# Patient Record
Sex: FEMALE | URBAN - METROPOLITAN AREA
[De-identification: names, ages, dates, MRNs, and addresses within clinical notes are randomized per-mention and may not be internally consistent; named-entity substitution may affect disease eponyms.]

---

## 2019-11-22 ENCOUNTER — HOSPITAL ENCOUNTER (OUTPATIENT)
Dept: LAB | Age: 59
Discharge: HOME | End: 2019-11-22
Payer: OTHER GOVERNMENT

## 2019-11-22 ENCOUNTER — OFFICE VISIT (OUTPATIENT)
Dept: ONCOLOGY | Age: 59
End: 2019-11-22

## 2019-11-22 ENCOUNTER — HOSPITAL ENCOUNTER (OUTPATIENT)
Dept: ONCOLOGY | Age: 59
Discharge: HOME | End: 2019-11-22

## 2019-11-22 VITALS
RESPIRATION RATE: 16 BRPM | HEART RATE: 113 BPM | TEMPERATURE: 97.3 F | BODY MASS INDEX: 17.66 KG/M2 | OXYGEN SATURATION: 100 % | WEIGHT: 119.2 LBS | SYSTOLIC BLOOD PRESSURE: 141 MMHG | HEIGHT: 69 IN | DIASTOLIC BLOOD PRESSURE: 83 MMHG

## 2019-11-22 DIAGNOSIS — D64.9 CHRONIC ANEMIA: ICD-10-CM

## 2019-11-22 DIAGNOSIS — D50.8 IRON DEFICIENCY ANEMIA SECONDARY TO INADEQUATE DIETARY IRON INTAKE: ICD-10-CM

## 2019-11-22 DIAGNOSIS — D50.8 IRON DEFICIENCY ANEMIA SECONDARY TO INADEQUATE DIETARY IRON INTAKE: Primary | ICD-10-CM

## 2019-11-22 LAB
ALBUMIN SERPL-MCNC: 4.1 G/DL (ref 3.4–5)
ALBUMIN/GLOB SERPL: 1.2 {RATIO} (ref 0.8–1.7)
ALP SERPL-CCNC: 67 U/L (ref 45–117)
ALT SERPL-CCNC: 24 U/L (ref 13–56)
ANION GAP SERPL CALC-SCNC: 3 MMOL/L (ref 3–18)
AST SERPL-CCNC: 12 U/L (ref 10–38)
BASO+EOS+MONOS # BLD AUTO: 0.4 K/UL (ref 0–2.3)
BASO+EOS+MONOS NFR BLD AUTO: 11 % (ref 0.1–17)
BILIRUB SERPL-MCNC: 0.4 MG/DL (ref 0.2–1)
BUN SERPL-MCNC: 18 MG/DL (ref 7–18)
BUN/CREAT SERPL: 21 (ref 12–20)
CALCIUM SERPL-MCNC: 9.9 MG/DL (ref 8.5–10.1)
CHLORIDE SERPL-SCNC: 106 MMOL/L (ref 100–111)
CO2 SERPL-SCNC: 32 MMOL/L (ref 21–32)
CREAT SERPL-MCNC: 0.86 MG/DL (ref 0.6–1.3)
DIFFERENTIAL METHOD BLD: ABNORMAL
ERYTHROCYTE [DISTWIDTH] IN BLOOD BY AUTOMATED COUNT: 12.5 % (ref 11.5–14.5)
GLOBULIN SER CALC-MCNC: 3.4 G/DL (ref 2–4)
GLUCOSE SERPL-MCNC: 104 MG/DL (ref 74–99)
HCT VFR BLD AUTO: 35.4 % (ref 36–48)
HGB BLD-MCNC: 11.2 G/DL (ref 12–16)
LYMPHOCYTES # BLD: 1.7 K/UL (ref 1.1–5.9)
LYMPHOCYTES NFR BLD: 41 % (ref 14–44)
MCH RBC QN AUTO: 29 PG (ref 25–35)
MCHC RBC AUTO-ENTMCNC: 31.6 G/DL (ref 31–37)
MCV RBC AUTO: 91.7 FL (ref 78–102)
NEUTS SEG # BLD: 1.9 K/UL (ref 1.8–9.5)
NEUTS SEG NFR BLD: 48 % (ref 40–70)
PLATELET # BLD AUTO: 165 K/UL (ref 140–440)
POTASSIUM SERPL-SCNC: 3.8 MMOL/L (ref 3.5–5.5)
PROT SERPL-MCNC: 7.5 G/DL (ref 6.4–8.2)
RBC # BLD AUTO: 3.86 M/UL (ref 4.1–5.1)
RETICS/RBC NFR AUTO: 1.1 % (ref 0.5–2.3)
SODIUM SERPL-SCNC: 141 MMOL/L (ref 136–145)
WBC # BLD AUTO: 4 K/UL (ref 4.5–13)

## 2019-11-22 PROCEDURE — 84165 PROTEIN E-PHORESIS SERUM: CPT

## 2019-11-22 PROCEDURE — 80053 COMPREHEN METABOLIC PANEL: CPT

## 2019-11-22 PROCEDURE — 82728 ASSAY OF FERRITIN: CPT

## 2019-11-22 PROCEDURE — 83021 HEMOGLOBIN CHROMOTOGRAPHY: CPT

## 2019-11-22 PROCEDURE — 83540 ASSAY OF IRON: CPT

## 2019-11-22 PROCEDURE — 36415 COLL VENOUS BLD VENIPUNCTURE: CPT

## 2019-11-22 PROCEDURE — 82668 ASSAY OF ERYTHROPOIETIN: CPT

## 2019-11-22 PROCEDURE — 85045 AUTOMATED RETICULOCYTE COUNT: CPT

## 2019-11-22 NOTE — PROGRESS NOTES
Hematology/Oncology Consultation Note    Name: Renaldo Sheffield  Date: 2019  : 1960    PCP: Yue Erickson MD       Ms. Yomi Epstein  is a 62 y.o. -American woman who was referred for an evaluation of anemia of unclear etiology. Subjective:     Chief complaint: Anemia    History of present illness:  Ms. Yomi Epstein is a 68-year-old -American woman who was referred for an evaluation of anemia. Back in 2019, during a hospitalization the patient was found to have anemia, on 2019, with a hemoglobin of 9.5 g/dL and hematocrit of 28.9%. At the time a WBC count was slightly elevated at 16.2 and a platelet to 150,490. A CBC dated 4/10/2019 showed that a WBC count 7.8, hemoglobin was 12.2 g/dL, hematocrit was 35.5%, and a platelet count was 367,172. She denies having a history of gastrointestinal or genitourinary blood loss. There is no history of ulcer disease. She is complaining of some fatigue and weakness. She is using a walker for mobility support. Past Medical History:   Diagnosis Date    Blurred vision     Change in weight     Double vision     Fast heart beat     Frequent urinary incontinence     Hemorrhoids     Poor appetite        No Known Allergies    History reviewed. No pertinent surgical history.     Social History     Socioeconomic History    Marital status: UNKNOWN     Spouse name: Not on file    Number of children: Not on file    Years of education: Not on file    Highest education level: Not on file   Occupational History    Not on file   Social Needs    Financial resource strain: Not on file    Food insecurity:     Worry: Not on file     Inability: Not on file    Transportation needs:     Medical: Not on file     Non-medical: Not on file   Tobacco Use    Smoking status: Never Smoker    Smokeless tobacco: Never Used   Substance and Sexual Activity    Alcohol use: Not Currently    Drug use: Never    Sexual activity: Not Currently Lifestyle    Physical activity:     Days per week: Not on file     Minutes per session: Not on file    Stress: Not on file   Relationships    Social connections:     Talks on phone: Not on file     Gets together: Not on file     Attends Orthodoxy service: Not on file     Active member of club or organization: Not on file     Attends meetings of clubs or organizations: Not on file     Relationship status: Not on file    Intimate partner violence:     Fear of current or ex partner: Not on file     Emotionally abused: Not on file     Physically abused: Not on file     Forced sexual activity: Not on file   Other Topics Concern    Not on file   Social History Narrative    Not on file       Family History   Problem Relation Age of Onset    Cancer Sister     Diabetes Sister     Diabetes Brother     Diabetes Sister     Diabetes Brother        Current Outpatient Medications   Medication Sig Dispense Refill    tolterodine tartrate (DETROL PO) Take  by mouth.  eletriptan hydrobromide (RELPAX PO) Take  by mouth. Review of Systems    General ROS:The patient has complaints of some fatigue and weakness. Psychological ROS: patient denies having any psychological symptoms such as hallucinations, depression or anxiety. Ophthalmic ROS:the patient denies having any visual impairment or eye discomfort. ENT ROS: there are no abnormalities reported. Allergy and Immunology ROS:the patient denies having any seasonal allergies or allergies to medications other than those already outlined above. Hematological and Lymphatic ROS: the patient denies having any bruising, bleeding or lymphadenopathy. Endocrine ROS: the patient denies having any heat or cold intolerance. There is no history of diabetes or thyroid disorders. Breast ROS: the patient denies having any history of breast mass, nipple discharge, or lumps.   Respiratory ROS:the patient denies having any cough, shortness of breath, or dyspnea on exertion. Cardiovascular ROS: there are no complaints of chest pain, palpitations, chest pounding, or dyspnea on exertion. Gastrointestinal ROS: the patient denies having nausea, emesis, diarrhea, constipation, or blood in the stool. Genito-Urinary ROS: the patient denies having urinary urgency, frequency, or dysuria. Musculoskeletal ROS: with the exception of mild arthralgias the patient has no other musculoskeletal complaints. Neurological ROS: the patient denies having any numbness, tingling, or neurologic deficits. Dermatological ROS:patient denies having any unexplained rash, skin ulcerations, or hives. Objective:     Visit Vitals  /83   Pulse (!) 113   Temp 97.3 °F (36.3 °C) (Oral)   Resp 16   Ht 5' 9\" (1.753 m)   Wt 54.1 kg (119 lb 3.2 oz)   SpO2 100%   BMI 17.60 kg/m²        ECOGPS=0  Physical Exam:   Gen. Appearance: the patient is in no acute distress. Skin: There is no evidence of bruise or rash. HEENT: The head is normocephalic and atraumatic. The conjunctiva and sclera are clear. Pupils are equal, round, reactive to light, and accommodation. The extraocular movements are intact. ENT reveals no oral mucosal lesions or ulcerations. Neck: Supple without lymphadenopathy or thyromegaly. Lungs: Clear to auscultation and percussion; there are no wheezes or rhonchi. Heart: Regular rate and rhythm; there are no murmurs, gallops, or rubs. Abdomen: Bowel sounds are present and normal.  There is no guarding, tenderness, or hepatosplenomegaly. Extremities: There is no clubbing, cyanosis, or edema. Neurologic: There are no focal neurologic deficits. Lymphatics: There is no palpable peripheral lymphadenopathy. Lab data: The CBC from May 10, 2019 shows a WBC count of 14, hemoglobin is 9.9 g/dL, hematocrit is 29.5%, and the platelet count is 96,435. Assessment:   Chronic anemia: I have explained to the patient that she appears to have chronic anemia of unclear etiology.   I am concerned that she may have a functional iron deficiency versus a slowly evolving plasma cell dyscrasia or underlying myelodysplastic syndrome. Plan:   Chronic anemia: A comprehensive metabolic panel, iron profile, ferritin level, reticulocyte count, erythropoietin level, serum protein electrophoresis, and hemoglobin electrophoresis will be ordered. I will see the patient back in clinic in 2 weeks to review lab data and to discuss long-term options of management. Follow-up in 2 weeks. Orders Placed This Encounter    METABOLIC PANEL, COMPREHENSIVE     Standing Status:   Future     Standing Expiration Date:   11/22/2020    IRON PROFILE     Standing Status:   Future     Standing Expiration Date:   11/22/2020    FERRITIN     Standing Status:   Future     Standing Expiration Date:   11/22/2020    PROTEIN ELECTROPHORESIS     Standing Status:   Future     Standing Expiration Date:   11/22/2020    RETICULOCYTE COUNT     Standing Status:   Future     Standing Expiration Date:   11/22/2020    ERYTHROPOIETIN     Standing Status:   Future     Standing Expiration Date:   11/22/2020    HEMOGLOBIN FRACTIONATION     Standing Status:   Future     Standing Expiration Date:   11/22/2020    tolterodine tartrate (DETROL PO)     Sig: Take  by mouth.  eletriptan hydrobromide (RELPAX PO)     Sig: Take  by mouth. Tammy Gracia MD  11/22/2019      Please note: This document has been produced using voice recognition software. Unrecognized errors in transcription may be present.

## 2019-11-23 LAB
FERRITIN SERPL-MCNC: 69 NG/ML (ref 8–388)
IRON SATN MFR SERPL: 27 %
IRON SERPL-MCNC: 92 UG/DL (ref 50–175)
TIBC SERPL-MCNC: 347 UG/DL (ref 250–450)

## 2019-12-02 LAB
ALBUMIN SERPL ELPH-MCNC: NORMAL G/DL
ALBUMIN/GLOB SERPL: 1.3 {RATIO}
ALPHA1 GLOB SERPL ELPH-MCNC: NORMAL G/DL
ALPHA2 GLOB SERPL ELPH-MCNC: NORMAL G/DL
B-GLOBULIN SERPL ELPH-MCNC: NORMAL G/DL
DEPRECATED HGB OTHER BLD-IMP: 0 %
GAMMA GLOB SERPL ELPH-MCNC: NORMAL G/DL
GLOBULIN SER CALC-MCNC: NORMAL G/DL
HGB A MFR BLD: 98.2 %
HGB A2 MFR BLD COLUMN CHROM: 1.8 %
HGB C MFR BLD: 0 %
HGB F MFR BLD: 0 %
HGB FRACT BLD-IMP: NORMAL
HGB S BLD QL SOLY: NEGATIVE
HGB S MFR BLD: 0 %
M PROTEIN SERPL ELPH-MCNC: NORMAL G/DL
PROT SERPL-MCNC: NORMAL G/DL

## 2019-12-13 ENCOUNTER — HOSPITAL ENCOUNTER (OUTPATIENT)
Dept: LAB | Age: 59
Discharge: HOME | End: 2019-12-13
Payer: OTHER GOVERNMENT

## 2019-12-13 ENCOUNTER — OFFICE VISIT (OUTPATIENT)
Dept: ONCOLOGY | Age: 59
End: 2019-12-13

## 2019-12-13 ENCOUNTER — HOSPITAL ENCOUNTER (OUTPATIENT)
Dept: ONCOLOGY | Age: 59
Discharge: HOME | End: 2019-12-13

## 2019-12-13 VITALS
BODY MASS INDEX: 17.42 KG/M2 | DIASTOLIC BLOOD PRESSURE: 89 MMHG | SYSTOLIC BLOOD PRESSURE: 139 MMHG | RESPIRATION RATE: 16 BRPM | WEIGHT: 117.6 LBS | HEIGHT: 69 IN | OXYGEN SATURATION: 99 % | HEART RATE: 109 BPM | TEMPERATURE: 97.4 F

## 2019-12-13 DIAGNOSIS — D64.9 CHRONIC ANEMIA: ICD-10-CM

## 2019-12-13 DIAGNOSIS — D72.819 CHRONIC LEUKOPENIA: ICD-10-CM

## 2019-12-13 DIAGNOSIS — D50.8 IRON DEFICIENCY ANEMIA SECONDARY TO INADEQUATE DIETARY IRON INTAKE: ICD-10-CM

## 2019-12-13 DIAGNOSIS — D50.8 IRON DEFICIENCY ANEMIA SECONDARY TO INADEQUATE DIETARY IRON INTAKE: Primary | ICD-10-CM

## 2019-12-13 LAB
BASO+EOS+MONOS # BLD AUTO: 0.1 K/UL (ref 0–2.3)
BASO+EOS+MONOS NFR BLD AUTO: 3 % (ref 0.1–17)
DIFFERENTIAL METHOD BLD: ABNORMAL
ERYTHROCYTE [DISTWIDTH] IN BLOOD BY AUTOMATED COUNT: 12.7 % (ref 11.5–14.5)
HCT VFR BLD AUTO: 36.1 % (ref 36–48)
HGB BLD-MCNC: 11.5 G/DL (ref 12–16)
LYMPHOCYTES # BLD: 1.9 K/UL (ref 1.1–5.9)
LYMPHOCYTES NFR BLD: 47 % (ref 14–44)
MCH RBC QN AUTO: 29.3 PG (ref 25–35)
MCHC RBC AUTO-ENTMCNC: 31.9 G/DL (ref 31–37)
MCV RBC AUTO: 91.9 FL (ref 78–102)
NEUTS SEG # BLD: 2 K/UL (ref 1.8–9.5)
NEUTS SEG NFR BLD: 50 % (ref 40–70)
PLATELET # BLD AUTO: 169 K/UL (ref 140–440)
RBC # BLD AUTO: 3.93 M/UL (ref 4.1–5.1)
WBC # BLD AUTO: 4 K/UL (ref 4.5–13)

## 2019-12-13 PROCEDURE — 88184 FLOWCYTOMETRY/ TC 1 MARKER: CPT

## 2019-12-13 PROCEDURE — 88185 FLOWCYTOMETRY/TC ADD-ON: CPT

## 2019-12-13 PROCEDURE — 36415 COLL VENOUS BLD VENIPUNCTURE: CPT

## 2019-12-13 NOTE — PATIENT INSTRUCTIONS
Anemia: Care Instructions  Your Care Instructions    Anemia is a low level of red blood cells, which carry oxygen throughout your body. Many things can cause anemia. Lack of iron is one of the most common causes. Your body needs iron to make hemoglobin, a substance in red blood cells that carries oxygen from the lungs to your body's cells. Without enough iron, the body produces fewer and smaller red blood cells. As a result, your body's cells do not get enough oxygen, and you feel tired and weak. And you may have trouble concentrating. Bleeding is the most common cause of a lack of iron. You may have heavy menstrual bleeding or bleeding caused by conditions such as ulcers, hemorrhoids, or cancer. Regular use of aspirin or other anti-inflammatory medicines (such as ibuprofen) also can cause bleeding in some people. A lack of iron in your diet also can cause anemia, especially at times when the body needs more iron, such as during pregnancy, infancy, and the teen years. Your doctor may have prescribed iron pills. It may take several months of treatment for your iron levels to return to normal. Your doctor also may suggest that you eat foods that are rich in iron, such as meat and beans. There are many other causes of anemia. It is not always due to a lack of iron. Finding the specific cause of your anemia will help your doctor find the right treatment for you. Follow-up care is a key part of your treatment and safety. Be sure to make and go to all appointments, and call your doctor if you are having problems. It's also a good idea to know your test results and keep a list of the medicines you take. How can you care for yourself at home? · Take your medicines exactly as prescribed. Call your doctor if you think you are having a problem with your medicine. · If your doctor recommends iron pills, take them as directed:  ? Try to take the pills on an empty stomach about 1 hour before or 2 hours after meals. But you may need to take iron with food to avoid an upset stomach. ? Do not take antacids or drink milk or caffeine drinks (such as coffee, tea, or cola) at the same time or within 2 hours of the time that you take your iron. They can make it hard for your body to absorb the iron. ? Vitamin C (from food or supplements) helps your body absorb iron. Try taking iron pills with a glass of orange juice or some other food that is high in vitamin C, such as citrus fruits. ? Iron pills may cause stomach problems, such as heartburn, nausea, diarrhea, constipation, and cramps. Be sure to drink plenty of fluids, and include fruits, vegetables, and fiber in your diet each day. Iron pills often make your bowel movements dark or green. ? If you forget to take an iron pill, do not take a double dose of iron the next time you take a pill. ? Keep iron pills out of the reach of small children. An overdose of iron can be very dangerous. · Follow your doctor's advice about eating iron-rich foods. These include red meat, shellfish, poultry, eggs, beans, raisins, whole-grain bread, and leafy green vegetables. · Steam vegetables to help them keep their iron content. When should you call for help? Call 911 anytime you think you may need emergency care. For example, call if:    · You have symptoms of a heart attack. These may include:  ? Chest pain or pressure, or a strange feeling in the chest.  ? Sweating. ? Shortness of breath. ? Nausea or vomiting. ? Pain, pressure, or a strange feeling in the back, neck, jaw, or upper belly or in one or both shoulders or arms. ? Lightheadedness or sudden weakness. ? A fast or irregular heartbeat. After you call 911, the  may tell you to chew 1 adult-strength or 2 to 4 low-dose aspirin. Wait for an ambulance.  Do not try to drive yourself.     · You passed out (lost consciousness).    Call your doctor now or seek immediate medical care if:    · You have new or increased shortness of breath.     · You are dizzy or lightheaded, or you feel like you may faint.     · Your fatigue and weakness continue or get worse.     · You have any abnormal bleeding, such as:  ? Nosebleeds. ? Vaginal bleeding that is different (heavier, more frequent, at a different time of the month) than what you are used to.  ? Bloody or black stools, or rectal bleeding. ? Bloody or pink urine.    Watch closely for changes in your health, and be sure to contact your doctor if:    · You do not get better as expected. Where can you learn more? Go to http://dena-teresa.info/. Enter R301 in the search box to learn more about \"Anemia: Care Instructions. \"  Current as of: March 28, 2019  Content Version: 12.2  © 5918-6503 [employer]. Care instructions adapted under license by [employer] (which disclaims liability or warranty for this information). If you have questions about a medical condition or this instruction, always ask your healthcare professional. [address] any warranty or liability for your use of this information.

## 2019-12-13 NOTE — PROGRESS NOTES
Hematology/medical oncology progress note    12/13/2019  Feli Cordon  YOB: 1960    PCP: Dr. Henny Hill    Diagnosis: Functional iron deficiency anemia    Ms. Forrest Li is a 42-year-old woman who was seen on 11/22/2018 for an assessment of anemia. I explained to the patient that on the day of her initial visitation she had a hemoglobin of 11.3 g/dL. Iron studies revealed that her iron level was 92 mcg/dL with an iron saturation of 27%. Her kidney function was normal with a BUN of 18 and a creatinine of 0.82 mg/dL. An SPEP was completed and this revealed a normal monoclonal paraprotein level which was not detected. The CBC from today shows that her WBC count 4, hemoglobin has increased to 11.5 g/dL with hematocrit of 36.1%, and a platelet count of 649,023. She has a functional iron deficiency and at this time I am recommending that she take Slow Fe 1 tablet daily. If she develops any constipation she should take Senokot-S 1 tablet to 2 tablets at bedtime. Since her leukocyte population has declined we will schedule an immunophenotyping profile to assess for any evidence of immunophenotypic aberrancy. Otherwise, I will plan to see her back in clinic in 2 months. Total time 25 minutes, greater than 50% of the time was in counseling and coordination of care. Wilberto Darling MD, Kasia Roberts

## 2019-12-16 LAB
COMMENT , 490046: NORMAL
FLOW INTERPRETATION: NORMAL
SPECIMEN SOURCE: NORMAL

## 2019-12-20 LAB — EPO SERPL-ACNC: 13.7 MIU/ML

## 2020-02-28 ENCOUNTER — HOSPITAL ENCOUNTER (OUTPATIENT)
Dept: LAB | Age: 60
Discharge: HOME | End: 2020-02-28
Payer: OTHER GOVERNMENT

## 2020-02-28 ENCOUNTER — OFFICE VISIT (OUTPATIENT)
Dept: ONCOLOGY | Age: 60
End: 2020-02-28

## 2020-02-28 ENCOUNTER — HOSPITAL ENCOUNTER (OUTPATIENT)
Dept: ONCOLOGY | Age: 60
Discharge: HOME | End: 2020-02-28

## 2020-02-28 VITALS
RESPIRATION RATE: 16 BRPM | SYSTOLIC BLOOD PRESSURE: 154 MMHG | WEIGHT: 118 LBS | TEMPERATURE: 97.5 F | BODY MASS INDEX: 17.48 KG/M2 | OXYGEN SATURATION: 98 % | HEART RATE: 108 BPM | HEIGHT: 69 IN | DIASTOLIC BLOOD PRESSURE: 99 MMHG

## 2020-02-28 DIAGNOSIS — D64.9 CHRONIC ANEMIA: ICD-10-CM

## 2020-02-28 DIAGNOSIS — D72.819 CHRONIC LEUKOPENIA: ICD-10-CM

## 2020-02-28 DIAGNOSIS — D50.8 IRON DEFICIENCY ANEMIA SECONDARY TO INADEQUATE DIETARY IRON INTAKE: ICD-10-CM

## 2020-02-28 DIAGNOSIS — D64.9 CHRONIC ANEMIA: Primary | ICD-10-CM

## 2020-02-28 LAB
ALBUMIN SERPL-MCNC: 4.1 G/DL (ref 3.4–5)
ALBUMIN/GLOB SERPL: 1.2 {RATIO} (ref 0.8–1.7)
ALP SERPL-CCNC: 72 U/L (ref 45–117)
ALT SERPL-CCNC: 24 U/L (ref 13–56)
ANION GAP SERPL CALC-SCNC: 5 MMOL/L (ref 3–18)
AST SERPL-CCNC: 17 U/L (ref 10–38)
BASO+EOS+MONOS # BLD AUTO: 0.4 K/UL (ref 0–2.3)
BASO+EOS+MONOS NFR BLD AUTO: 11 % (ref 0.1–17)
BILIRUB SERPL-MCNC: 0.4 MG/DL (ref 0.2–1)
BUN SERPL-MCNC: 18 MG/DL (ref 7–18)
BUN/CREAT SERPL: 22 (ref 12–20)
CALCIUM SERPL-MCNC: 9.2 MG/DL (ref 8.5–10.1)
CHLORIDE SERPL-SCNC: 107 MMOL/L (ref 100–111)
CO2 SERPL-SCNC: 30 MMOL/L (ref 21–32)
CREAT SERPL-MCNC: 0.82 MG/DL (ref 0.6–1.3)
DIFFERENTIAL METHOD BLD: ABNORMAL
ERYTHROCYTE [DISTWIDTH] IN BLOOD BY AUTOMATED COUNT: 12.4 % (ref 11.5–14.5)
FERRITIN SERPL-MCNC: 59 NG/ML (ref 8–388)
GLOBULIN SER CALC-MCNC: 3.5 G/DL (ref 2–4)
GLUCOSE SERPL-MCNC: 85 MG/DL (ref 74–99)
HCT VFR BLD AUTO: 35.9 % (ref 36–48)
HGB BLD-MCNC: 11.3 G/DL (ref 12–16)
IRON SATN MFR SERPL: 23 % (ref 20–50)
IRON SERPL-MCNC: 74 UG/DL (ref 50–175)
LYMPHOCYTES # BLD: 1.4 K/UL (ref 1.1–5.9)
LYMPHOCYTES NFR BLD: 35 % (ref 14–44)
MCH RBC QN AUTO: 28.7 PG (ref 25–35)
MCHC RBC AUTO-ENTMCNC: 31.5 G/DL (ref 31–37)
MCV RBC AUTO: 91.1 FL (ref 78–102)
NEUTS SEG # BLD: 2.1 K/UL (ref 1.8–9.5)
NEUTS SEG NFR BLD: 54 % (ref 40–70)
PLATELET # BLD AUTO: 170 K/UL (ref 140–440)
POTASSIUM SERPL-SCNC: 3.7 MMOL/L (ref 3.5–5.5)
PROT SERPL-MCNC: 7.6 G/DL (ref 6.4–8.2)
RBC # BLD AUTO: 3.94 M/UL (ref 4.1–5.1)
SODIUM SERPL-SCNC: 142 MMOL/L (ref 136–145)
TIBC SERPL-MCNC: 323 UG/DL (ref 250–450)
WBC # BLD AUTO: 3.9 K/UL (ref 4.5–13)

## 2020-02-28 PROCEDURE — 82728 ASSAY OF FERRITIN: CPT

## 2020-02-28 PROCEDURE — 80053 COMPREHEN METABOLIC PANEL: CPT

## 2020-02-28 PROCEDURE — 83540 ASSAY OF IRON: CPT

## 2020-02-28 PROCEDURE — 36415 COLL VENOUS BLD VENIPUNCTURE: CPT

## 2020-02-28 NOTE — PATIENT INSTRUCTIONS
Iron Deficiency Anemia: Care Instructions  Your Care Instructions    Anemia means that you do not have enough red blood cells. Red blood cells carry oxygen around your body. When you have anemia, it can make you pale, weak, and tired. Many things can cause anemia. The most common cause is loss of blood. This can happen if you have heavy menstrual periods. It can also happen if you have bleeding in your stomach or bowel. You can also get anemia if you don't have enough iron in your diet or if it's hard for your body to absorb iron. In some cases, pregnancy causes anemia. That's because a pregnant woman needs more iron. Your doctor may do more tests to find the cause of your anemia. If a disease or other health problem is causing it, your doctor will treat that problem. It's important to follow up with your doctor to make sure that your iron level returns to normal.  Follow-up care is a key part of your treatment and safety. Be sure to make and go to all appointments, and call your doctor if you are having problems. It's also a good idea to know your test results and keep a list of the medicines you take. How can you care for yourself at home? · If your doctor recommended iron pills, take them as directed. ? Try to take the pills on an empty stomach. You can do this about 1 hour before or 2 hours after meals. But you may need to take iron with food to avoid an upset stomach. ? Do not take antacids or drink milk or anything with caffeine within 2 hours of when you take your iron. They can keep your body from absorbing the iron well. ? Vitamin C helps your body absorb iron. You may want to take iron pills with a glass of orange juice or some other food high in vitamin C.  ? Iron pills may cause stomach problems. These include heartburn, nausea, diarrhea, constipation, and cramps. It can help to drink plenty of fluids and include fruits, vegetables, and fiber in your diet.   ? It's normal for iron pills to make your stool a greenish or grayish black. But internal bleeding can also cause dark stool. So it's important to tell your doctor about any color changes. ? Call your doctor if you think you are having a problem with your iron pills. Even after you start to feel better, it will take several months for your body to build up its supply of iron. ? If you miss a pill, don't take a double dose. ? Keep iron pills out of the reach of small children. Too much iron can be very dangerous. · Eat foods with a lot of iron. These include red meat, shellfish, poultry, and eggs. They also include beans, raisins, whole-grain bread, and leafy green vegetables. · Steam your vegetables. This is the best way to prepare them if you want to get as much iron as possible. · Be safe with medicines. Do not take nonsteroidal anti-inflammatory pain relievers unless your doctor tells you to. These include aspirin, naproxen (Aleve), and ibuprofen (Advil, Motrin). · Liquid iron can stain your teeth. But you can mix it with water or juice and drink it with a straw. Then it won't get on your teeth. When should you call for help? Call 911 anytime you think you may need emergency care. For example, call if:    · You passed out (lost consciousness).    Call your doctor now or seek immediate medical care if:    · You are short of breath.     · You are dizzy or light-headed, or you feel like you may faint.     · You have new or worse bleeding.    Watch closely for changes in your health, and be sure to contact your doctor if:    · You feel weaker or more tired than usual.     · You do not get better as expected. Where can you learn more? Go to http://dena-teresa.info/. Enter V970 in the search box to learn more about \"Iron Deficiency Anemia: Care Instructions. \"  Current as of: March 28, 2019  Content Version: 12.2  © 8204-0488 [employer].  Care instructions adapted under license by [employer] (which disclaims liability or warranty for this information). If you have questions about a medical condition or this instruction, always ask your healthcare professional. [address] any warranty or liability for your use of this information. Anemia: Care Instructions  Your Care Instructions    Anemia is a low level of red blood cells, which carry oxygen throughout your body. Many things can cause anemia. Lack of iron is one of the most common causes. Your body needs iron to make hemoglobin, a substance in red blood cells that carries oxygen from the lungs to your body's cells. Without enough iron, the body produces fewer and smaller red blood cells. As a result, your body's cells do not get enough oxygen, and you feel tired and weak. And you may have trouble concentrating. Bleeding is the most common cause of a lack of iron. You may have heavy menstrual bleeding or bleeding caused by conditions such as ulcers, hemorrhoids, or cancer. Regular use of aspirin or other anti-inflammatory medicines (such as ibuprofen) also can cause bleeding in some people. A lack of iron in your diet also can cause anemia, especially at times when the body needs more iron, such as during pregnancy, infancy, and the teen years. Your doctor may have prescribed iron pills. It may take several months of treatment for your iron levels to return to normal. Your doctor also may suggest that you eat foods that are rich in iron, such as meat and beans. There are many other causes of anemia. It is not always due to a lack of iron. Finding the specific cause of your anemia will help your doctor find the right treatment for you. Follow-up care is a key part of your treatment and safety. Be sure to make and go to all appointments, and call your doctor if you are having problems. It's also a good idea to know your test results and keep a list of the medicines you take.   How can you care for yourself at home?  · Take your medicines exactly as prescribed. Call your doctor if you think you are having a problem with your medicine. · If your doctor recommends iron pills, take them as directed:  ? Try to take the pills on an empty stomach about 1 hour before or 2 hours after meals. But you may need to take iron with food to avoid an upset stomach. ? Do not take antacids or drink milk or caffeine drinks (such as coffee, tea, or cola) at the same time or within 2 hours of the time that you take your iron. They can make it hard for your body to absorb the iron. ? Vitamin C (from food or supplements) helps your body absorb iron. Try taking iron pills with a glass of orange juice or some other food that is high in vitamin C, such as citrus fruits. ? Iron pills may cause stomach problems, such as heartburn, nausea, diarrhea, constipation, and cramps. Be sure to drink plenty of fluids, and include fruits, vegetables, and fiber in your diet each day. Iron pills often make your bowel movements dark or green. ? If you forget to take an iron pill, do not take a double dose of iron the next time you take a pill. ? Keep iron pills out of the reach of small children. An overdose of iron can be very dangerous. · Follow your doctor's advice about eating iron-rich foods. These include red meat, shellfish, poultry, eggs, beans, raisins, whole-grain bread, and leafy green vegetables. · Steam vegetables to help them keep their iron content. When should you call for help? Call 911 anytime you think you may need emergency care. For example, call if:    · You have symptoms of a heart attack. These may include:  ? Chest pain or pressure, or a strange feeling in the chest.  ? Sweating. ? Shortness of breath. ? Nausea or vomiting. ? Pain, pressure, or a strange feeling in the back, neck, jaw, or upper belly or in one or both shoulders or arms. ? Lightheadedness or sudden weakness. ? A fast or irregular heartbeat.   After you call 911, the  may tell you to chew 1 adult-strength or 2 to 4 low-dose aspirin. Wait for an ambulance. Do not try to drive yourself.     · You passed out (lost consciousness).    Call your doctor now or seek immediate medical care if:    · You have new or increased shortness of breath.     · You are dizzy or lightheaded, or you feel like you may faint.     · Your fatigue and weakness continue or get worse.     · You have any abnormal bleeding, such as:  ? Nosebleeds. ? Vaginal bleeding that is different (heavier, more frequent, at a different time of the month) than what you are used to.  ? Bloody or black stools, or rectal bleeding. ? Bloody or pink urine.    Watch closely for changes in your health, and be sure to contact your doctor if:    · You do not get better as expected. Where can you learn more? Go to http://dena-teresa.info/. Enter R301 in the search box to learn more about \"Anemia: Care Instructions. \"  Current as of: March 28, 2019  Content Version: 12.2  © 9084-4454 [employer]. Care instructions adapted under license by [employer] (which disclaims liability or warranty for this information). If you have questions about a medical condition or this instruction, always ask your healthcare professional. [address] any warranty or liability for your use of this information. Iron-Rich Diet: Care Instructions  Your Care Instructions    Your body needs iron to make hemoglobin. Hemoglobin is a substance in red blood cells that carries oxygen from the lungs to cells all through your body. If you do not get enough iron, your body makes fewer and smaller red blood cells. As a result, your body's cells may not get enough oxygen. Adult men need 8 milligrams of iron a day; adult women need 18 milligrams of iron a day. After menopause, women need 8 milligrams of iron a day. A pregnant woman needs 27 milligrams of iron a day.  Infants and young children have higher iron needs relative to their size than other age groups. People who have lost blood because of ulcers or heavy menstrual periods may become very low in iron and may develop anemia. Most people can get the iron their bodies need by eating enough of certain iron-rich foods. Your doctor may recommend that you take an iron supplement along with eating an iron-rich diet. Follow-up care is a key part of your treatment and safety. Be sure to make and go to all appointments, and call your doctor if you are having problems. It's also a good idea to know your test results and keep a list of the medicines you take. How can you care for yourself at home? · Make iron-rich foods a part of your daily diet. Iron-rich foods include:  ? All meats, such as chicken, beef, lamb, pork, fish, and shellfish. Liver is especially high in iron. ? Leafy green vegetables. ? Raisins, peas, beans, lentils, barley, and eggs. ? Iron-fortified breakfast cereals. · Eat foods with vitamin C along with iron-rich foods. Vitamin C helps you absorb more iron from food. Drink a glass of orange juice or another citrus juice with your food. · Eat meat and vegetables or grains together. The iron in meat helps your body absorb the iron in other foods. Where can you learn more? Go to http://dena-teresa.info/. Enter 0328 [id number] in the search box to learn more about \"Iron-Rich Diet: Care Instructions. \"  Current as of: November 7, 2018  Content Version: 12.2  © 6512-6167 [employer]. Care instructions adapted under license by [employer] (which disclaims liability or warranty for this information). If you have questions about a medical condition or this instruction, always ask your healthcare professional. [address] any warranty or liability for your use of this information.

## 2020-02-28 NOTE — PROGRESS NOTES
Hematology/Oncology  Progress Note    Name: Gabriele Robins  Date: 2020  : 1960    Estrella Blake MD     Ms. Margaret Diggs is a 62y.o. year old female who was seen for Functional iron Deficiency Anemia       Subjective:     Ms. Naila Colorado is a 66-year-old woman who was seen on 2018 for an assessment of anemia. She was inform that  She has a functional iron deficiency it was recommended that she take Slow Fe 1 tablet daily. If she develops any constipation she should take Senokot-S 1 tablet to 2 tablets at bedtime. Today she is in the office for follow up and she report that she has not been complainant with taking the iron supplement because of the constipation. She denies weakness or fatigue at this time. She also denies any fevers or infections at this time. She does not have any other issues to report. She is using a walker for support and mobility       Past medical history, family history, and social history: these were reviewed and remains unchanged. Past Medical History:   Diagnosis Date    Blurred vision     Change in weight     Double vision     Fast heart beat     Frequent urinary incontinence     Hemorrhoids     Poor appetite      No past surgical history on file.   Social History     Socioeconomic History    Marital status: UNKNOWN     Spouse name: Not on file    Number of children: Not on file    Years of education: Not on file    Highest education level: Not on file   Occupational History    Not on file   Social Needs    Financial resource strain: Not on file    Food insecurity:     Worry: Not on file     Inability: Not on file    Transportation needs:     Medical: Not on file     Non-medical: Not on file   Tobacco Use    Smoking status: Never Smoker    Smokeless tobacco: Never Used   Substance and Sexual Activity    Alcohol use: Not Currently    Drug use: Never    Sexual activity: Not Currently   Lifestyle    Physical activity:     Days per week: Not on file     Minutes per session: Not on file    Stress: Not on file   Relationships    Social connections:     Talks on phone: Not on file     Gets together: Not on file     Attends Sikh service: Not on file     Active member of club or organization: Not on file     Attends meetings of clubs or organizations: Not on file     Relationship status: Not on file    Intimate partner violence:     Fear of current or ex partner: Not on file     Emotionally abused: Not on file     Physically abused: Not on file     Forced sexual activity: Not on file   Other Topics Concern    Not on file   Social History Narrative    Not on file     Family History   Problem Relation Age of Onset    Cancer Sister     Diabetes Sister     Diabetes Brother     Diabetes Sister     Diabetes Brother      Current Outpatient Medications   Medication Sig Dispense Refill    eletriptan hydrobromide (RELPAX PO) Take  by mouth.  tolterodine tartrate (DETROL PO) Take  by mouth. Review of Systems  Constitutional: The patient has no acute distress or discomfort. HEENT: The patient denies recent head trauma, eye pain, blurred vision,  hearing deficit, oropharyngeal mucosal pain or lesions, and the patient denies throat pain or discomfort. Lymphatics: The patient denies palpable peripheral lymphadenopathy. Hematologic: The patient denies having bruising, bleeding, or progressive fatigue. Respiratory: Patient denies having shortness of breath, cough, sputum production, fever, or dyspnea on exertion. Cardiovascular: The patient denies having leg pain, leg swelling, heart palpitations, chest permit, chest pain, or lightheadedness. The patient denies having dyspnea on exertion. Gastrointestinal: The patient denies having nausea, emesis, or diarrhea. The patient denies having any hematemesis or blood in the stool. Genitourinary: Patient denies having urinary urgency, frequency, or dysuria.   The patient denies having blood in the urine. Psychological: The patient denies having symptoms of nervousness, anxiety, depression, or thoughts of harming himself some of this. Skin: Patient denies having skin rashes, skin, ulcerations, or unexplained itching or pruritus. Musculoskeletal: The patient denies having pain in the joints or bones. Objective:     Visit Vitals  BP (!) 154/99 (BP 1 Location: Left arm, BP Patient Position: Sitting)   Pulse (!) 108   Temp 97.5 °F (36.4 °C) (Oral)   Resp 16   Ht 5' 9\" (1.753 m)   Wt 53.5 kg (118 lb)   SpO2 98%   BMI 17.43 kg/m²     ECOG PS1  Physical Exam:   Gen. Appearance: The patient is in no acute distress. Skin: There is no bruise or rash. HEENT: The exam is unremarkable. Neck: Supple without lymphadenopathy or thyromegaly. Lungs: Clear to auscultation and percussion; there are no wheezes or rhonchi. Heart: Regular rate and rhythm; there are no murmurs, gallops, or rubs. Abdomen: Bowel sounds are present and normal.  There is no guarding, tenderness, or hepatosplenomegaly. Extremities: There is no clubbing, cyanosis, or edema. Neurologic: There are no focal neurologic deficits. Lymphatics: There is no palpable peripheral lymphadenopathy. Musculoskeletal: The patient has full range of motion at all joints. There is no evidence of joint deformity or effusions. There is no focal joint tenderness. Psychological/psychiatric: There is no clinical evidence of anxiety, depression, or melancholy.     Lab data:      Results for orders placed or performed during the hospital encounter of 02/28/20   CBC WITH 3 PART DIFF     Status: Abnormal   Result Value Ref Range Status    WBC 3.9 (L) 4.5 - 13.0 K/uL Final    RBC 3.94 (L) 4.10 - 5.10 M/uL Final    HGB 11.3 (L) 12.0 - 16.0 g/dL Final    HCT 35.9 (L) 36 - 48 % Final    MCV 91.1 78 - 102 FL Final    MCH 28.7 25.0 - 35.0 PG Final    MCHC 31.5 31 - 37 g/dL Final    RDW 12.4 11.5 - 14.5 % Final    PLATELET 372 903 - 808 K/uL Final    NEUTROPHILS 54 40 - 70 % Final    MIXED CELLS 11 0.1 - 17 % Final    LYMPHOCYTES 35 14 - 44 % Final    ABS. NEUTROPHILS 2.1 1.8 - 9.5 K/UL Final    ABS. MIXED CELLS 0.4 0.0 - 2.3 K/uL Final    ABS. LYMPHOCYTES 1.4 1.1 - 5.9 K/UL Final     Comment: Test performed at [address] [place] [address] or [facility]patient [facility] Location. Reviewed by Medical Director. DF AUTOMATED   Final           Assessment:     1. Chronic anemia    2. Chronic leukopenia    3. Iron deficiency anemia secondary to inadequate dietary iron intake        Plan:   Chronic anemia: A comprehensive metabolic panel, iron profile, ferritin level will be ordered. i have inform patient that her CBC for day shows a WBC of 3.9, Hemoglobin of 11.3 g/dl and hematocrit of 35.9%. PLT of 170,000. I have Recommended that she take Slow Fe 1 tablet daily. If she develops any constipation she should take Senokot-S 1 tablet to 2 tablets at bedtime. She should also incorporate iron fortified foods in her diet. Leukopenia: I have informed patient that her CBC for today shows a WBC of 3.9, ANC is normal at 2.1, total neutrophils is 54%. Recent flow done is pending at this time. Pt denies any fevers or infections. I will see the patient back in clinic in 12 weeks to review result     Orders Placed This Encounter    COMPLETE CBC & AUTO DIFF WBC    InHouse CBC ([facility])     Standing Status:   Future     Number of Occurrences:   1     Standing Expiration Date:   3/5/5949    METABOLIC PANEL, COMPREHENSIVE     Standing Status:   Future     Standing Expiration Date:   2/28/2021    FERRITIN     Standing Status:   Future     Standing Expiration Date:   2/28/2021    IRON PROFILE     Standing Status:   Future     Standing Expiration Date:   2/28/2021       Wash Shadow, NP  2/28/2020       I have assessed the patient independently and  agree with the full assessment as outlined. eLslie Tony MD, FACP      Please note:  This document has been produced using voice recognition software. Unrecognized errors in transcription may be present.

## 2020-02-28 NOTE — PROGRESS NOTES
Identified pt with two pt identifiers(name and ). Reviewed record in preparation for visit and have obtained necessary documentation. Chief Complaint   Patient presents with    Follow-up     2 month         Health Maintenance Due   Topic    Hepatitis C Screening     DTaP/Tdap/Td series (1 - Tdap)    PAP AKA CERVICAL CYTOLOGY     Lipid Screen     Shingrix Vaccine Age 49> (1 of 2)    Breast Cancer Screen Mammogram     FOBT Q1Y Age 54-65     Influenza Age 5 to Adult        Coordination of Care Questionnaire:  :   1) Have you been to an emergency room, urgent care, or hospitalized since your last visit? If yes, where when, and reason for visit? no       2. Have seen or consulted any other health care provider since your last visit? If yes, where when, and reason for visit? NO      3) Do you have an Advanced Directive/ Living Will in place? NO  If yes, do we have a copy on file NO  If no, would you like information NO      Learning Assessment 2019   PRIMARY LEARNER Patient   PRIMARY LANGUAGE ENGLISH   LEARNER PREFERENCE PRIMARY LISTENING     DEMONSTRATION   ANSWERED BY patient   RELATIONSHIP SELF        3 most recent PHQ Screens 2020   Little interest or pleasure in doing things Not at all   Feeling down, depressed, irritable, or hopeless Not at all   Total Score PHQ 2 0        Abuse Screening Questionnaire 2019   Do you ever feel afraid of your partner? N   Are you in a relationship with someone who physically or mentally threatens you? N   Is it safe for you to go home? Y        Fall Risk Assessment, last 12 mths 2019   Able to walk? Yes   Fall in past 12 months?  No